# Patient Record
(demographics unavailable — no encounter records)

---

## 2024-10-11 NOTE — HISTORY OF PRESENT ILLNESS
[FreeTextEntry1] : NABIL BONILLA 30 YO, presents for Annual G 4 P  -  on 24 @ [facility] 1 Miscarriage on 23 @ 4 weeks 1 TOP @ 8 weeks.  Not nursing LMP: 24 - Regular menses Medication- PNV No family history of breast cancer. To do monthly SBE.  C/O pelvic discomfort on & off, no dyspareunia.  For Pelvic sonogram with Verito- to call for results.